# Patient Record
Sex: FEMALE | Race: WHITE | Employment: UNEMPLOYED | URBAN - METROPOLITAN AREA
[De-identification: names, ages, dates, MRNs, and addresses within clinical notes are randomized per-mention and may not be internally consistent; named-entity substitution may affect disease eponyms.]

---

## 2022-05-09 LAB
ABO, EXTERNAL RESULT: NORMAL
C. TRACHOMATIS, EXTERNAL RESULT: NEGATIVE
HEP B, EXTERNAL RESULT: NORMAL
HEPATITIS C ANTIBODY, EXTERNAL RESULT: NORMAL
HIV, EXTERNAL RESULT: NORMAL
N. GONORRHOEAE, EXTERNAL RESULT: NEGATIVE
RH FACTOR, EXTERNAL RESULT: POSITIVE
RHOGAM, EXTERNAL RESULT: NEGATIVE
RPR, EXTERNAL RESULT: NORMAL
RUBELLA TITER, EXTERNAL RESULT: NORMAL

## 2022-06-14 ENCOUNTER — INITIAL PRENATAL (OUTPATIENT)
Dept: OBGYN CLINIC | Age: 22
End: 2022-06-14

## 2022-06-14 VITALS — BODY MASS INDEX: 26.32 KG/M2 | WEIGHT: 139.4 LBS | HEIGHT: 61 IN

## 2022-06-14 DIAGNOSIS — F32.A ANXIETY AND DEPRESSION: ICD-10-CM

## 2022-06-14 DIAGNOSIS — F41.9 ANXIETY AND DEPRESSION: ICD-10-CM

## 2022-06-14 PROBLEM — Z34.00 SUPERVISION OF NORMAL FIRST TEEN PREGNANCY: Status: ACTIVE | Noted: 2022-06-14

## 2022-06-14 RX ORDER — ONDANSETRON 4 MG/1
4 TABLET, FILM COATED ORAL EVERY 8 HOURS PRN
COMMUNITY

## 2022-06-14 NOTE — PROGRESS NOTES
Kathleen Dixon G1, P0 presents to the office today for NOB talk and transfer in appointment. Patient is 11w6d and transferring in from [facility]. She had one visit there. She had US there confirming REMEDIOS of 12/28/22, and also US in ER that matches this. PNL labs, GC/Chl  completed as well. Records are in [facility]. No US performed today. FHT obtained by [id number] BPM.  Patient education was discussed including: nutrition, appropriate weight gain, diet, exercise, travel, hospital classes, breastfeeding/lactation services, flu vaccine, Tdap, glucola, GBS, and Corona Virus and Zika precautions. Genetic testing discussed in depth and patient elects NIPT, and this was drawn today. Patients past medical history is significant for childhood asthma, anxiety and depression. She is to return to the office in 1 weeks for NOB exam. All questions answered and she voiced full understanding. She is encouraged to call the office with any questions or concerns.

## 2022-06-23 ENCOUNTER — ROUTINE PRENATAL (OUTPATIENT)
Dept: OBGYN CLINIC | Age: 22
End: 2022-06-23
Payer: MEDICAID

## 2022-06-23 VITALS — WEIGHT: 139.6 LBS | DIASTOLIC BLOOD PRESSURE: 60 MMHG | BODY MASS INDEX: 26.38 KG/M2 | SYSTOLIC BLOOD PRESSURE: 110 MMHG

## 2022-06-23 DIAGNOSIS — O36.8390 UNABLE TO HEAR FETAL HEART TONES AS REASON FOR ULTRASOUND SCAN: Primary | ICD-10-CM

## 2022-06-23 DIAGNOSIS — Z13.89 SCREENING FOR GENITOURINARY CONDITION: ICD-10-CM

## 2022-06-23 DIAGNOSIS — Z34.02 ENCOUNTER FOR SUPERVISION OF NORMAL FIRST PREGNANCY IN SECOND TRIMESTER: ICD-10-CM

## 2022-06-23 DIAGNOSIS — Z3A.13 13 WEEKS GESTATION OF PREGNANCY: ICD-10-CM

## 2022-06-23 DIAGNOSIS — O35.8XX0 SUSPECTED DAMAGE TO FETUS FROM DISEASE IN MOTHER, ANTEPARTUM CONDITION, SINGLE OR UNSPECIFIED FETUS: ICD-10-CM

## 2022-06-23 LAB
GLUCOSE URINE, POC: NEGATIVE
PROTEIN,URINE, POC: NEGATIVE

## 2022-06-23 PROCEDURE — 99213 OFFICE O/P EST LOW 20 MIN: CPT | Performed by: OBSTETRICS & GYNECOLOGY

## 2022-06-23 PROCEDURE — 81002 URINALYSIS NONAUTO W/O SCOPE: CPT | Performed by: OBSTETRICS & GYNECOLOGY

## 2022-06-23 PROCEDURE — 76816 OB US FOLLOW-UP PER FETUS: CPT | Performed by: OBSTETRICS & GYNECOLOGY

## 2022-07-21 ENCOUNTER — ROUTINE PRENATAL (OUTPATIENT)
Dept: OBGYN CLINIC | Age: 22
End: 2022-07-21
Payer: MEDICAID

## 2022-07-21 VITALS — WEIGHT: 140.4 LBS | DIASTOLIC BLOOD PRESSURE: 80 MMHG | BODY MASS INDEX: 26.53 KG/M2 | SYSTOLIC BLOOD PRESSURE: 114 MMHG

## 2022-07-21 DIAGNOSIS — Z3A.17 17 WEEKS GESTATION OF PREGNANCY: ICD-10-CM

## 2022-07-21 DIAGNOSIS — Z34.02 ENCOUNTER FOR SUPERVISION OF NORMAL FIRST PREGNANCY IN SECOND TRIMESTER: Primary | ICD-10-CM

## 2022-07-21 DIAGNOSIS — Z13.89 SCREENING FOR GENITOURINARY CONDITION: ICD-10-CM

## 2022-07-21 LAB
GLUCOSE URINE, POC: NEGATIVE
PROTEIN,URINE, POC: NEGATIVE

## 2022-07-21 PROCEDURE — 99213 OFFICE O/P EST LOW 20 MIN: CPT | Performed by: OBSTETRICS & GYNECOLOGY

## 2022-07-21 PROCEDURE — 81002 URINALYSIS NONAUTO W/O SCOPE: CPT | Performed by: OBSTETRICS & GYNECOLOGY

## 2022-07-21 NOTE — PROGRESS NOTES
Ptl precautions. rtc 3 weeks for anatomy us. Having some congestion / fatigue / nausea. pcos - was told that she couldn't have babies. rtc 3 weeks for anatomy us.

## 2022-08-11 ENCOUNTER — ROUTINE PRENATAL (OUTPATIENT)
Dept: OBGYN CLINIC | Age: 22
End: 2022-08-11
Payer: MEDICAID

## 2022-08-11 VITALS — WEIGHT: 143.2 LBS | DIASTOLIC BLOOD PRESSURE: 68 MMHG | SYSTOLIC BLOOD PRESSURE: 110 MMHG | BODY MASS INDEX: 27.06 KG/M2

## 2022-08-11 DIAGNOSIS — Z34.02 ENCOUNTER FOR SUPERVISION OF NORMAL FIRST PREGNANCY IN SECOND TRIMESTER: ICD-10-CM

## 2022-08-11 DIAGNOSIS — Z3A.20 20 WEEKS GESTATION OF PREGNANCY: ICD-10-CM

## 2022-08-11 DIAGNOSIS — Z13.89 SCREENING FOR GENITOURINARY CONDITION: ICD-10-CM

## 2022-08-11 DIAGNOSIS — Z36.3 ENCOUNTER FOR ROUTINE SCREENING FOR FETAL MALFORMATION USING ULTRASOUND: Primary | ICD-10-CM

## 2022-08-11 LAB
GLUCOSE URINE, POC: NEGATIVE
PROTEIN,URINE, POC: NEGATIVE

## 2022-08-11 PROCEDURE — 99213 OFFICE O/P EST LOW 20 MIN: CPT | Performed by: OBSTETRICS & GYNECOLOGY

## 2022-08-11 PROCEDURE — 81002 URINALYSIS NONAUTO W/O SCOPE: CPT | Performed by: OBSTETRICS & GYNECOLOGY

## 2022-08-11 PROCEDURE — 76805 OB US >/= 14 WKS SNGL FETUS: CPT | Performed by: OBSTETRICS & GYNECOLOGY

## 2022-09-13 RX ORDER — FLUOXETINE 10 MG/1
CAPSULE ORAL
COMMUNITY
Start: 2022-08-12

## 2022-09-14 ENCOUNTER — ROUTINE PRENATAL (OUTPATIENT)
Dept: OBGYN CLINIC | Age: 22
End: 2022-09-14
Payer: MEDICAID

## 2022-09-14 VITALS — BODY MASS INDEX: 28.04 KG/M2 | DIASTOLIC BLOOD PRESSURE: 62 MMHG | SYSTOLIC BLOOD PRESSURE: 108 MMHG | WEIGHT: 148.4 LBS

## 2022-09-14 DIAGNOSIS — Z3A.25 25 WEEKS GESTATION OF PREGNANCY: ICD-10-CM

## 2022-09-14 DIAGNOSIS — Z13.89 SCREENING FOR GENITOURINARY CONDITION: ICD-10-CM

## 2022-09-14 DIAGNOSIS — Z36.2 ENCOUNTER FOR FOLLOW-UP ULTRASOUND OF FETAL ANATOMY: Primary | ICD-10-CM

## 2022-09-14 DIAGNOSIS — Z34.02 SUPERVISION OF NORMAL FIRST PREGNANCY IN SECOND TRIMESTER: ICD-10-CM

## 2022-09-14 LAB
GLUCOSE URINE, POC: NEGATIVE
PROTEIN,URINE, POC: NEGATIVE

## 2022-09-14 PROCEDURE — 81002 URINALYSIS NONAUTO W/O SCOPE: CPT | Performed by: OBSTETRICS & GYNECOLOGY

## 2022-09-14 PROCEDURE — 99213 OFFICE O/P EST LOW 20 MIN: CPT | Performed by: OBSTETRICS & GYNECOLOGY

## 2022-09-14 PROCEDURE — 76816 OB US FOLLOW-UP PER FETUS: CPT | Performed by: OBSTETRICS & GYNECOLOGY

## 2022-09-14 RX ORDER — IPRATROPIUM BROMIDE 21 UG/1
2 SPRAY, METERED NASAL EVERY 12 HOURS
COMMUNITY
Start: 2022-09-11 | End: 2022-10-27

## 2022-09-14 RX ORDER — PREDNISONE 5 MG/1
TABLET ORAL
COMMUNITY
Start: 2022-09-11 | End: 2022-09-14

## 2022-09-14 NOTE — PROGRESS NOTES
Patient Active Problem List    Diagnosis Date Noted    Supervision of normal first teen pregnancy 06/14/2022    Anxiety and depression 06/14/2022   Still suboptimal views of heart and profile- recheck in 3 weeks with glucola  All concerns addressed

## 2022-10-06 ENCOUNTER — ROUTINE PRENATAL (OUTPATIENT)
Dept: OBGYN CLINIC | Age: 22
End: 2022-10-06
Payer: COMMERCIAL

## 2022-10-06 VITALS — WEIGHT: 153 LBS | SYSTOLIC BLOOD PRESSURE: 118 MMHG | DIASTOLIC BLOOD PRESSURE: 70 MMHG | BODY MASS INDEX: 28.91 KG/M2

## 2022-10-06 DIAGNOSIS — Z36.2 ENCOUNTER FOR FOLLOW-UP ULTRASOUND OF FETAL ANATOMY: Primary | ICD-10-CM

## 2022-10-06 DIAGNOSIS — Z13.89 SCREENING FOR GENITOURINARY CONDITION: ICD-10-CM

## 2022-10-06 DIAGNOSIS — Z13.0 SCREENING, IRON DEFICIENCY ANEMIA: ICD-10-CM

## 2022-10-06 DIAGNOSIS — Z34.03 ENCOUNTER FOR SUPERVISION OF NORMAL FIRST PREGNANCY IN THIRD TRIMESTER: ICD-10-CM

## 2022-10-06 DIAGNOSIS — Z13.1 SCREENING FOR DIABETES MELLITUS: ICD-10-CM

## 2022-10-06 DIAGNOSIS — Z36.3 ENCOUNTER FOR ROUTINE SCREENING FOR FETAL MALFORMATION USING ULTRASOUND: ICD-10-CM

## 2022-10-06 DIAGNOSIS — Z3A.28 28 WEEKS GESTATION OF PREGNANCY: ICD-10-CM

## 2022-10-06 LAB
GLUCOSE 1 HOUR: 136 MG/DL
GLUCOSE URINE, POC: NEGATIVE
HGB BLD-MCNC: 11.1 G/DL (ref 11.7–15.4)
PROTEIN,URINE, POC: NEGATIVE

## 2022-10-06 PROCEDURE — 81002 URINALYSIS NONAUTO W/O SCOPE: CPT | Performed by: OBSTETRICS & GYNECOLOGY

## 2022-10-06 PROCEDURE — 76816 OB US FOLLOW-UP PER FETUS: CPT | Performed by: OBSTETRICS & GYNECOLOGY

## 2022-10-06 PROCEDURE — 99902 PR PRENATAL VISIT: CPT | Performed by: OBSTETRICS & GYNECOLOGY

## 2022-10-06 NOTE — PROGRESS NOTES
F/u anatomy  Glucola today  Patient Active Problem List    Diagnosis Date Noted    Supervision of normal first teen pregnancy 06/14/2022    Anxiety and depression 06/14/2022

## 2022-10-20 ENCOUNTER — NURSE ONLY (OUTPATIENT)
Dept: OBGYN CLINIC | Age: 22
End: 2022-10-20

## 2022-10-20 DIAGNOSIS — Z13.1 SCREENING FOR DIABETES MELLITUS: Primary | ICD-10-CM

## 2022-10-20 LAB
GESTATIONAL 3HR GTT: ABNORMAL
GLUCOSE 1H P 100 G GLC PO SERPL-MCNC: 129 MG/DL (ref 65–180)
GLUCOSE 2 HOUR: 122 MG/DL (ref 65–155)
GLUCOSE P FAST SERPL-MCNC: 96 MG/DL (ref 65–95)
GLUCOSE, 3 HOUR: 124 MG/DL (ref 65–140)

## 2022-10-27 ENCOUNTER — ROUTINE PRENATAL (OUTPATIENT)
Dept: OBGYN CLINIC | Age: 22
End: 2022-10-27
Payer: COMMERCIAL

## 2022-10-27 VITALS — WEIGHT: 156.4 LBS | SYSTOLIC BLOOD PRESSURE: 102 MMHG | DIASTOLIC BLOOD PRESSURE: 60 MMHG | BODY MASS INDEX: 29.55 KG/M2

## 2022-10-27 DIAGNOSIS — Z34.03 ENCOUNTER FOR SUPERVISION OF NORMAL FIRST PREGNANCY IN THIRD TRIMESTER: Primary | ICD-10-CM

## 2022-10-27 DIAGNOSIS — Z3A.31 31 WEEKS GESTATION OF PREGNANCY: ICD-10-CM

## 2022-10-27 DIAGNOSIS — Z13.89 SCREENING FOR GENITOURINARY CONDITION: ICD-10-CM

## 2022-10-27 DIAGNOSIS — Z23 FLU VACCINE NEED: ICD-10-CM

## 2022-10-27 DIAGNOSIS — Z23 NEED FOR DTAP VACCINE: ICD-10-CM

## 2022-10-27 LAB
GLUCOSE URINE, POC: NEGATIVE
PROTEIN,URINE, POC: NEGATIVE

## 2022-10-27 PROCEDURE — 90715 TDAP VACCINE 7 YRS/> IM: CPT | Performed by: OBSTETRICS & GYNECOLOGY

## 2022-10-27 PROCEDURE — 90472 IMMUNIZATION ADMIN EACH ADD: CPT | Performed by: OBSTETRICS & GYNECOLOGY

## 2022-10-27 PROCEDURE — 99902 PR PRENATAL VISIT: CPT | Performed by: OBSTETRICS & GYNECOLOGY

## 2022-10-27 PROCEDURE — 90674 CCIIV4 VAC NO PRSV 0.5 ML IM: CPT | Performed by: OBSTETRICS & GYNECOLOGY

## 2022-10-27 PROCEDURE — 90471 IMMUNIZATION ADMIN: CPT | Performed by: OBSTETRICS & GYNECOLOGY

## 2022-10-27 PROCEDURE — 81002 URINALYSIS NONAUTO W/O SCOPE: CPT | Performed by: OBSTETRICS & GYNECOLOGY

## 2022-10-27 RX ORDER — FAMOTIDINE 10 MG
10 TABLET ORAL 2 TIMES DAILY
COMMUNITY

## 2022-11-14 ENCOUNTER — ROUTINE PRENATAL (OUTPATIENT)
Dept: OBGYN CLINIC | Age: 22
End: 2022-11-14
Payer: COMMERCIAL

## 2022-11-14 VITALS — SYSTOLIC BLOOD PRESSURE: 118 MMHG | DIASTOLIC BLOOD PRESSURE: 72 MMHG | BODY MASS INDEX: 30.99 KG/M2 | WEIGHT: 164 LBS

## 2022-11-14 DIAGNOSIS — Z13.89 SCREENING FOR GENITOURINARY CONDITION: ICD-10-CM

## 2022-11-14 DIAGNOSIS — Z3A.33 33 WEEKS GESTATION OF PREGNANCY: ICD-10-CM

## 2022-11-14 DIAGNOSIS — Z34.03 ENCOUNTER FOR SUPERVISION OF NORMAL FIRST PREGNANCY IN THIRD TRIMESTER: Primary | ICD-10-CM

## 2022-11-14 LAB
GLUCOSE URINE, POC: NEGATIVE
PROTEIN,URINE, POC: NEGATIVE

## 2022-11-14 PROCEDURE — 81002 URINALYSIS NONAUTO W/O SCOPE: CPT | Performed by: NURSE PRACTITIONER

## 2022-11-14 PROCEDURE — 99902 PR PRENATAL VISIT: CPT | Performed by: NURSE PRACTITIONER

## 2022-11-14 NOTE — PROGRESS NOTES
Doing well. No complaints. PTL precautions and [address] reviewed.  RTC 2 weeks for GBS

## 2022-11-22 ENCOUNTER — TELEPHONE (OUTPATIENT)
Dept: OBGYN CLINIC | Age: 22
End: 2022-11-22

## 2022-11-22 ENCOUNTER — HOSPITAL ENCOUNTER (INPATIENT)
Age: 22
LOS: 3 days | Discharge: HOME | End: 2022-11-25
Attending: OBSTETRICS & GYNECOLOGY | Admitting: OBSTETRICS & GYNECOLOGY
Payer: COMMERCIAL

## 2022-11-22 PROBLEM — O60.00 PRETERM LABOR WITHOUT DELIVERY: Status: ACTIVE | Noted: 2022-11-22

## 2022-11-22 LAB
ABO + RH BLD: NORMAL
AMNISURE, POC: NEGATIVE
BACTERIA SPEC CULT: NORMAL
BASOPHILS # BLD: 0.1 K/UL (ref 0–0.2)
BASOPHILS NFR BLD: 0 % (ref 0–2)
BLOOD GROUP ANTIBODIES SERPL: NORMAL
DIFFERENTIAL METHOD BLD: ABNORMAL
EOSINOPHIL # BLD: 0 K/UL (ref 0–0.8)
EOSINOPHIL NFR BLD: 0 % (ref 0.5–7.8)
ERYTHROCYTE [DISTWIDTH] IN BLOOD BY AUTOMATED COUNT: 13 % (ref 11.9–14.6)
HCT VFR BLD AUTO: 36.4 % (ref 35.8–46.3)
HGB BLD-MCNC: 11.9 G/DL (ref 11.7–15.4)
IMM GRANULOCYTES # BLD AUTO: 0.1 K/UL (ref 0–0.5)
IMM GRANULOCYTES NFR BLD AUTO: 1 % (ref 0–5)
LYMPHOCYTES # BLD: 2.1 K/UL (ref 0.5–4.6)
LYMPHOCYTES NFR BLD: 13 % (ref 13–44)
Lab: NORMAL
MCH RBC QN AUTO: 30.7 PG (ref 26.1–32.9)
MCHC RBC AUTO-ENTMCNC: 32.7 G/DL (ref 31.4–35)
MCV RBC AUTO: 94.1 FL (ref 82–102)
MONOCYTES # BLD: 1.3 K/UL (ref 0.1–1.3)
MONOCYTES NFR BLD: 8 % (ref 4–12)
NEGATIVE QC PASS/FAIL: NORMAL
NEUTS SEG # BLD: 12.4 K/UL (ref 1.7–8.2)
NEUTS SEG NFR BLD: 78 % (ref 43–78)
NRBC # BLD: 0 K/UL (ref 0–0.2)
PLATELET # BLD AUTO: 349 K/UL (ref 150–450)
PMV BLD AUTO: 10 FL (ref 9.4–12.3)
POSITIVE QC PASS/FAIL: NORMAL
RBC # BLD AUTO: 3.87 M/UL (ref 4.05–5.2)
SERVICE CMNT-IMP: NORMAL
SPECIMEN EXP DATE BLD: NORMAL
WBC # BLD AUTO: 16 K/UL (ref 4.3–11.1)

## 2022-11-22 PROCEDURE — 85025 COMPLETE CBC W/AUTO DIFF WBC: CPT

## 2022-11-22 PROCEDURE — 6360000002 HC RX W HCPCS: Performed by: OBSTETRICS & GYNECOLOGY

## 2022-11-22 PROCEDURE — 6370000000 HC RX 637 (ALT 250 FOR IP): Performed by: OBSTETRICS & GYNECOLOGY

## 2022-11-22 PROCEDURE — 87081 CULTURE SCREEN ONLY: CPT

## 2022-11-22 PROCEDURE — 87653 STREP B DNA AMP PROBE: CPT

## 2022-11-22 PROCEDURE — 59025 FETAL NON-STRESS TEST: CPT

## 2022-11-22 PROCEDURE — 1100000000 HC RM PRIVATE

## 2022-11-22 PROCEDURE — 99285 EMERGENCY DEPT VISIT HI MDM: CPT

## 2022-11-22 PROCEDURE — 36415 COLL VENOUS BLD VENIPUNCTURE: CPT

## 2022-11-22 PROCEDURE — 2580000003 HC RX 258: Performed by: OBSTETRICS & GYNECOLOGY

## 2022-11-22 PROCEDURE — 86901 BLOOD TYPING SEROLOGIC RH(D): CPT

## 2022-11-22 RX ORDER — TERBUTALINE SULFATE 1 MG/ML
0.25 INJECTION, SOLUTION SUBCUTANEOUS ONCE
Status: COMPLETED | OUTPATIENT
Start: 2022-11-22 | End: 2022-11-22

## 2022-11-22 RX ORDER — ONDANSETRON 8 MG/1
4 TABLET, ORALLY DISINTEGRATING ORAL EVERY 8 HOURS PRN
Status: DISCONTINUED | OUTPATIENT
Start: 2022-11-22 | End: 2022-11-25 | Stop reason: HOSPADM

## 2022-11-22 RX ORDER — SODIUM CHLORIDE 0.9 % (FLUSH) 0.9 %
5-40 SYRINGE (ML) INJECTION EVERY 12 HOURS SCHEDULED
Status: DISCONTINUED | OUTPATIENT
Start: 2022-11-22 | End: 2022-11-25 | Stop reason: ALTCHOICE

## 2022-11-22 RX ORDER — SODIUM CHLORIDE, SODIUM LACTATE, POTASSIUM CHLORIDE, CALCIUM CHLORIDE 600; 310; 30; 20 MG/100ML; MG/100ML; MG/100ML; MG/100ML
INJECTION, SOLUTION INTRAVENOUS CONTINUOUS
Status: DISCONTINUED | OUTPATIENT
Start: 2022-11-22 | End: 2022-11-24 | Stop reason: ALTCHOICE

## 2022-11-22 RX ORDER — SODIUM CHLORIDE 0.9 % (FLUSH) 0.9 %
5-40 SYRINGE (ML) INJECTION PRN
Status: DISCONTINUED | OUTPATIENT
Start: 2022-11-22 | End: 2022-11-25 | Stop reason: ALTCHOICE

## 2022-11-22 RX ORDER — FAMOTIDINE 20 MG/1
10 TABLET, FILM COATED ORAL 2 TIMES DAILY
Status: DISCONTINUED | OUTPATIENT
Start: 2022-11-22 | End: 2022-11-24 | Stop reason: ALTCHOICE

## 2022-11-22 RX ORDER — ACETAMINOPHEN 650 MG/1
650 SUPPOSITORY RECTAL EVERY 4 HOURS PRN
Status: DISCONTINUED | OUTPATIENT
Start: 2022-11-22 | End: 2022-11-24 | Stop reason: ALTCHOICE

## 2022-11-22 RX ORDER — NIFEDIPINE 10 MG/1
20 CAPSULE ORAL EVERY 6 HOURS SCHEDULED
Status: DISCONTINUED | OUTPATIENT
Start: 2022-11-22 | End: 2022-11-24 | Stop reason: ALTCHOICE

## 2022-11-22 RX ORDER — SODIUM CHLORIDE 9 MG/ML
INJECTION, SOLUTION INTRAVENOUS PRN
Status: DISCONTINUED | OUTPATIENT
Start: 2022-11-22 | End: 2022-11-24 | Stop reason: ALTCHOICE

## 2022-11-22 RX ORDER — FLUOXETINE 10 MG/1
10 CAPSULE ORAL DAILY
Status: DISCONTINUED | OUTPATIENT
Start: 2022-11-23 | End: 2022-11-25 | Stop reason: HOSPADM

## 2022-11-22 RX ORDER — BETAMETHASONE SODIUM PHOSPHATE AND BETAMETHASONE ACETATE 3; 3 MG/ML; MG/ML
12 INJECTION, SUSPENSION INTRA-ARTICULAR; INTRALESIONAL; INTRAMUSCULAR; SOFT TISSUE EVERY 24 HOURS
Status: COMPLETED | OUTPATIENT
Start: 2022-11-22 | End: 2022-11-23

## 2022-11-22 RX ORDER — ACETAMINOPHEN 325 MG/1
650 TABLET ORAL EVERY 4 HOURS PRN
Status: DISCONTINUED | OUTPATIENT
Start: 2022-11-22 | End: 2022-11-24 | Stop reason: ALTCHOICE

## 2022-11-22 RX ORDER — ONDANSETRON 2 MG/ML
4 INJECTION INTRAMUSCULAR; INTRAVENOUS EVERY 6 HOURS PRN
Status: DISCONTINUED | OUTPATIENT
Start: 2022-11-22 | End: 2022-11-25 | Stop reason: ALTCHOICE

## 2022-11-22 RX ADMIN — BUTORPHANOL TARTRATE 1 MG: 2 INJECTION, SOLUTION INTRAMUSCULAR; INTRAVENOUS at 22:43

## 2022-11-22 RX ADMIN — TERBUTALINE SULFATE 0.25 MG: 1 INJECTION, SOLUTION SUBCUTANEOUS at 16:31

## 2022-11-22 RX ADMIN — TERBUTALINE SULFATE 0.25 MG: 1 INJECTION, SOLUTION SUBCUTANEOUS at 19:38

## 2022-11-22 RX ADMIN — BETAMETHASONE SODIUM PHOSPHATE AND BETAMETHASONE ACETATE 12 MG: 3; 3 INJECTION, SUSPENSION INTRA-ARTICULAR; INTRALESIONAL; INTRAMUSCULAR at 16:32

## 2022-11-22 RX ADMIN — ACETAMINOPHEN 650 MG: 325 TABLET, FILM COATED ORAL at 19:37

## 2022-11-22 RX ADMIN — SODIUM CHLORIDE, POTASSIUM CHLORIDE, SODIUM LACTATE AND CALCIUM CHLORIDE: 600; 310; 30; 20 INJECTION, SOLUTION INTRAVENOUS at 22:42

## 2022-11-22 RX ADMIN — FAMOTIDINE 10 MG: 20 TABLET, FILM COATED ORAL at 21:24

## 2022-11-22 RX ADMIN — NIFEDIPINE 20 MG: 10 CAPSULE ORAL at 17:20

## 2022-11-22 RX ADMIN — SODIUM CHLORIDE, POTASSIUM CHLORIDE, SODIUM LACTATE AND CALCIUM CHLORIDE: 600; 310; 30; 20 INJECTION, SOLUTION INTRAVENOUS at 16:49

## 2022-11-22 ASSESSMENT — PAIN SCALES - GENERAL
PAINLEVEL_OUTOF10: 7
PAINLEVEL_OUTOF10: 7

## 2022-11-22 ASSESSMENT — PAIN DESCRIPTION - LOCATION: LOCATION: BACK

## 2022-11-22 NOTE — H&P
Obstetrics History & Physical    Name: Rebecca Abdi  Date: 2022 4:12 PM  MRN#: [id number]  : 2000  Age/Sex: 23 y. o.female  Admission Date: 2022  Admitting Provider: Debra Espino MD    HPI: Rebecca Abdi is a 23 y.o.  female with Estimated Date of Delivery: 22 at 34w6d gestation who presents for contractions/possible labor. Her current obstetrical history is significant for uncomplicated pregnancy. Prenatal records reviewed. Irregular back and lower abdominal pains since last night. Worsening today. She is unsure if they are contractions or not. She reports good fetal movement. She denies vaginal bleeding. She denies leakage of fluid. She has had an increased amount of thick yellow discharge recently. Past History:  Obstetrics History:  OB History    Para Term  AB Living   1             SAB IAB Ectopic Molar Multiple Live Births                    # Outcome Date GA Lbr Darion/2nd Weight Sex Delivery Anes PTL Lv   1 Current                Medical History:  Past Medical History:   Diagnosis Date    Anxiety and depression     Asthma     childhood    Kawasaki disease ([facility] [address]     at age 13     Past Surgical History:   Procedure Laterality Date    FINGER SURGERY      LABIAPLASTY      MYRINGOTOMY      WISDOM TOOTH EXTRACTION       Social History     Tobacco Use    Smoking status: Never    Smokeless tobacco: Never   Substance Use Topics    Alcohol use: Not Currently     Family History   Problem Relation Age of Onset    Hypertension Mother      Prior to Admission medications    Medication Sig Start Date End Date Taking?  Authorizing Provider   famotidine (PEPCID) 10 MG tablet Take 10 mg by mouth 2 times daily    Historical Provider, MD   FLUoxetine (PROZAC) 10 MG capsule  22   Historical Provider, MD   Prenatal MV & Min w/FA-DHA (PRENATAL GUMMIES) 0.18-25 MG CHEW Take 1 Dose by mouth daily 22   Ulysses Rave, MD   ondansetron (ZOFRAN) 4 MG tablet Take 4 mg by course, 1 dose brethine    Discussed with: Dr. Piotr Nieto

## 2022-11-22 NOTE — TELEPHONE ENCOUNTER
Pt significant other, Tico Villarreal called stating that he wanted to speak with someone regarding pressure is pts abdomen and lower back that is intermittent. Pt has been messaging through [employer] with [place] office and was advised to go to [facility]. Called back, Tico Villarreal is on [facility]. He then handed the phone to pt who stated that she is on her way to [facility] at this time. She needed to know what entrance to go into. Advised pt to go into entrance C and up to L&D. She voiced full understanding and is aware. All questions answered.

## 2022-11-23 PROCEDURE — 6360000002 HC RX W HCPCS: Performed by: OBSTETRICS & GYNECOLOGY

## 2022-11-23 PROCEDURE — 99255 IP/OBS CONSLTJ NEW/EST HI 80: CPT | Performed by: OBSTETRICS & GYNECOLOGY

## 2022-11-23 PROCEDURE — 99232 SBSQ HOSP IP/OBS MODERATE 35: CPT | Performed by: OBSTETRICS & GYNECOLOGY

## 2022-11-23 PROCEDURE — 6370000000 HC RX 637 (ALT 250 FOR IP): Performed by: OBSTETRICS & GYNECOLOGY

## 2022-11-23 PROCEDURE — 1100000000 HC RM PRIVATE

## 2022-11-23 RX ORDER — CYCLOBENZAPRINE HCL 10 MG
10 TABLET ORAL 3 TIMES DAILY PRN
Status: DISCONTINUED | OUTPATIENT
Start: 2022-11-23 | End: 2022-11-25 | Stop reason: HOSPADM

## 2022-11-23 RX ADMIN — BUTORPHANOL TARTRATE 1 MG: 2 INJECTION, SOLUTION INTRAMUSCULAR; INTRAVENOUS at 17:23

## 2022-11-23 RX ADMIN — CYCLOBENZAPRINE 10 MG: 10 TABLET, FILM COATED ORAL at 22:08

## 2022-11-23 RX ADMIN — BUTORPHANOL TARTRATE 1 MG: 2 INJECTION, SOLUTION INTRAMUSCULAR; INTRAVENOUS at 05:09

## 2022-11-23 RX ADMIN — BETAMETHASONE SODIUM PHOSPHATE AND BETAMETHASONE ACETATE 12 MG: 3; 3 INJECTION, SUSPENSION INTRA-ARTICULAR; INTRALESIONAL; INTRAMUSCULAR at 16:33

## 2022-11-23 RX ADMIN — NIFEDIPINE 20 MG: 10 CAPSULE ORAL at 06:08

## 2022-11-23 RX ADMIN — BUTORPHANOL TARTRATE 1 MG: 2 INJECTION, SOLUTION INTRAMUSCULAR; INTRAVENOUS at 01:44

## 2022-11-23 RX ADMIN — NIFEDIPINE 20 MG: 10 CAPSULE ORAL at 00:05

## 2022-11-23 RX ADMIN — FLUOXETINE HYDROCHLORIDE 10 MG: 10 CAPSULE ORAL at 09:21

## 2022-11-23 RX ADMIN — ACETAMINOPHEN 650 MG: 325 TABLET, FILM COATED ORAL at 21:52

## 2022-11-23 RX ADMIN — ONDANSETRON 4 MG: 8 TABLET, ORALLY DISINTEGRATING ORAL at 22:11

## 2022-11-23 RX ADMIN — FAMOTIDINE 10 MG: 20 TABLET, FILM COATED ORAL at 09:21

## 2022-11-23 RX ADMIN — ACETAMINOPHEN 650 MG: 325 TABLET, FILM COATED ORAL at 04:05

## 2022-11-23 RX ADMIN — NIFEDIPINE 10 MG: 10 CAPSULE ORAL at 12:00

## 2022-11-23 RX ADMIN — BUTORPHANOL TARTRATE 1 MG: 2 INJECTION, SOLUTION INTRAMUSCULAR; INTRAVENOUS at 21:55

## 2022-11-23 RX ADMIN — BUTORPHANOL TARTRATE 1 MG: 2 INJECTION, SOLUTION INTRAMUSCULAR; INTRAVENOUS at 12:00

## 2022-11-23 RX ADMIN — FAMOTIDINE 10 MG: 20 TABLET, FILM COATED ORAL at 21:55

## 2022-11-23 RX ADMIN — CYCLOBENZAPRINE 10 MG: 10 TABLET, FILM COATED ORAL at 11:59

## 2022-11-23 RX ADMIN — BUTORPHANOL TARTRATE 1 MG: 2 INJECTION, SOLUTION INTRAMUSCULAR; INTRAVENOUS at 08:11

## 2022-11-23 RX ADMIN — ONDANSETRON 4 MG: 2 INJECTION INTRAMUSCULAR; INTRAVENOUS at 10:08

## 2022-11-23 ASSESSMENT — PAIN DESCRIPTION - DESCRIPTORS
DESCRIPTORS: CRAMPING

## 2022-11-23 ASSESSMENT — PAIN SCALES - GENERAL
PAINLEVEL_OUTOF10: 8
PAINLEVEL_OUTOF10: 7
PAINLEVEL_OUTOF10: 7
PAINLEVEL_OUTOF10: 5
PAINLEVEL_OUTOF10: 7
PAINLEVEL_OUTOF10: 7
PAINLEVEL_OUTOF10: 9

## 2022-11-23 ASSESSMENT — PAIN DESCRIPTION - LOCATION
LOCATION: ABDOMEN
LOCATION: ABDOMEN;BACK
LOCATION: ABDOMEN;BACK
LOCATION: ABDOMEN
LOCATION: BACK;ABDOMEN
LOCATION: HEAD

## 2022-11-23 ASSESSMENT — PAIN DESCRIPTION - ORIENTATION: ORIENTATION: ANTERIOR

## 2022-11-23 NOTE — PROGRESS NOTES
Pt to room CAN for triage with chief complaint of low back pain. Assessment begins, EFM and [place] applied to a soft non tender abdomen and tracing well. Dr Khurram Cheney called to assess patient.

## 2022-11-23 NOTE — PROGRESS NOTES
High Risk Obstetrics Progress Note    Name: Jed Wade MRN: [id number]  SSN: [id number]    YOB: 2000  Age: 23 y.o. Sex: female      Subjective:      LOS: 1 day    Estimated Date of Delivery: 22   Gestational Age Today: 35w0d     Patient admitted for  labor. States she does have normal fetal movement and back and LLQ pain  and does not have pelvic pressure, vaginal bleeding , and vaginal leaking of fluid . Objective:     Vitals:  Blood pressure (!) 120/57, pulse (!) 121, temperature 98.2 °F (36.8 °C), temperature source Oral, resp. rate 18, last menstrual period 2022, SpO2 99 %.    Temp (24hrs), Av.3 °F (36.8 °C), Min:98 °F (36.7 °C), Max:98.5 °F (18.2 °C)    Systolic (24CHO), NA , Min:115 , DFE:510      Diastolic (04QNF), WAY:26, Min:57, Max:84       Intake and Output:         Physical Exam:  Abdomen soft non tender FF non tender     Very tender to palpation of bilateral SI joints with L greater that R and this is where her pain is most    Membranes:  Intact    Cervix 3/90/-3    Uterine Activity:  irregular Uc's on monitor    Fetal Heart Rate:  Reactive        Labs:   Recent Results (from the past 36 hour(s))   CBC with Auto Differential    Collection Time: 22  4:34 PM   Result Value Ref Range    WBC 16.0 (H) 4.3 - 11.1 K/uL    RBC 3.87 (L) 4.05 - 5.2 M/uL    Hemoglobin 11.9 11.7 - 15.4 g/dL    Hematocrit 36.4 35.8 - 46.3 %    MCV 94.1 82.0 - 102.0 FL    MCH 30.7 26.1 - 32.9 PG    MCHC 32.7 31.4 - 35.0 g/dL    RDW 13.0 11.9 - 14.6 %    Platelets 215 719 - 307 K/uL    MPV 10.0 9.4 - 12.3 FL    nRBC 0.00 0.0 - 0.2 K/uL    Differential Type AUTOMATED      Seg Neutrophils 78 43 - 78 %    Lymphocytes 13 13 - 44 %    Monocytes 8 4.0 - 12.0 %    Eosinophils % 0 (L) 0.5 - 7.8 %    Basophils 0 0.0 - 2.0 %    Immature Granulocytes 1 0.0 - 5.0 %    Segs Absolute 12.4 (H) 1.7 - 8.2 K/UL    Absolute Lymph # 2.1 0.5 - 4.6 K/UL    Absolute Mono # 1.3 0.1 - 1.3 K/UL Absolute Eos # 0.0 0.0 - 0.8 K/UL    Basophils Absolute 0.1 0.0 - 0.2 K/UL    Absolute Immature Granulocyte 0.1 0.0 - 0.5 K/UL   TYPE AND SCREEN    Collection Time: 22  4:34 PM   Result Value Ref Range    Crossmatch expiration date 2022,2359     ABO/Rh A POSITIVE     Antibody Screen NEG    Culture, Strep B Screen, Vaginal/Rectal    Collection Time: 22  4:34 PM    Specimen: Vaginal; Genital   Result Value Ref Range    Special Requests NO SPECIAL REQUESTS      Culture        No growth after short period of incubation. Further results to follow after overnight incubation. Group B Strep,PCR    Collection Time: 22  4:34 PM    Specimen: Vaginal/Rectal   Result Value Ref Range    Special Requests NO SPECIAL REQUESTS      Culture        NEGATIVE: GBS target nucleic acid is not detected. Presumed not colonized for GBS. POC AmniSure    Collection Time: 22  5:39 PM   Result Value Ref Range    Amnisure, POC Negative Negative    Lot Number [id number]     Positive QC Pass/Fail Acceptable     Negative QC Pass/Fail Acceptable        Assessment and Plan:      Principal Problem:     labor without delivery  Resolved Problems:    * No resolved hospital problems. *      Labor:  48 hour course of steroids to promote fetal lung maturity. Continuous Tocodynamometer   Cervix has not changed so not in labor   Discussed SI joint and Musculoskeletal pain  Will continue the procardia to help decrease contractions.   Add Flexeril   Encouraged try to rest  Will get second steroid later this afternoon

## 2022-11-23 NOTE — PROGRESS NOTES
Shailesh from [facility], [address] [place] [address]. Pt assumed for care. Into room 436 to discuss plan with pt. Explained concern about the effectiveness of an early epidural with a possible induction to follow 24 hrs later. Pt would like to wait on her epidural at this time and manage pain with meds.

## 2022-11-23 NOTE — PROGRESS NOTES
Pt transferred to 436 to get epidural for pain management per Dr Dank Valdivia. Pt talking and texting on her phone without any c/o voiced at present.

## 2022-11-23 NOTE — PROGRESS NOTES
Dr Rose Lo in to see pt. SVE done- no change in cervix. Plan of care discussed with pt and family to adjust medications to see if can get better pain control. Pt agrees with plan at this time.

## 2022-11-23 NOTE — CONSULTS
Maternal Fetal Medicine Inpatient Consult Note    Consulting:     Chief Complaint:  Pregnancy and Lower abdominal pain. History of Present Illness: 23 y.o.  at 29w0d gestation who presents with LAP and Cervix dilated to 3 cm/completely efface. Unchanged Cervical exam since admission. Patient complains of severe LL quadrant pain that ratiages to left lower back. Pain improves and she can rest for about an hour then returns. Pain started 3 days ago, is severe, spreading from the lower back and is getting worse and she cannot rest.    Patient has had vomiting earlier, no appetite. No regular contractions, LOF, VB. Good FM. Minimal edema. No chest pain or shortness of breath. No significant reflux, constipation, or other GI complaints. Review of Systems: per HPI, otherwise unremarkable.      History:  OB History    Para Term  AB Living   1             SAB IAB Ectopic Molar Multiple Live Births                    # Outcome Date GA Lbr Darion/2nd Weight Sex Delivery Anes PTL Lv   1 Current                Past Surgical History:   Procedure Laterality Date    FINGER SURGERY      LABIAPLASTY      MYRINGOTOMY      WISDOM TOOTH EXTRACTION         Past Medical History:   Diagnosis Date    Anxiety and depression     Asthma     childhood    Kawasaki disease ([facility] [address]     at age 13       Family History   Problem Relation Age of Onset    Hypertension Mother        Social History     Socioeconomic History    Marital status:      Spouse name: Not on file    Number of children: Not on file    Years of education: Not on file    Highest education level: Not on file   Occupational History    Not on file   Tobacco Use    Smoking status: Never    Smokeless tobacco: Never   Vaping Use    Vaping Use: Never used   Substance and Sexual Activity    Alcohol use: Not Currently    Drug use: Not Currently    Sexual activity: Yes     Partners: Male   Other Topics Concern    Not on file   Social History Narrative    Not on file     Social Determinants of Health     Financial Resource Strain: Not on file   Food Insecurity: Not on file   Transportation Needs: Not on file   Physical Activity: Not on file   Stress: Not on file   Social Connections: Not on file   Intimate Partner Violence: Not on file   Housing Stability: Not on file       Allergies   Allergen Reactions    Amoxicillin-Pot Clavulanate Hives     Other reaction(s): Hives/Swelling-Allergy         Current Facility-Administered Medications:     cyclobenzaprine (FLEXERIL) tablet 10 mg, 10 mg, Oral, TID PRN, Sharlene Crocker MD, 10 mg at 11/23/22 1159    lactated ringers infusion, , IntraVENous, Continuous, Elyse Lloyd MD, Last Rate: 125 mL/hr at 11/22/22 2242, New Bag at 11/22/22 2242    sodium chloride flush 0.9 % injection 5-40 mL, 5-40 mL, IntraVENous, 2 times per day, Elyse Lloyd MD    sodium chloride flush 0.9 % injection 5-40 mL, 5-40 mL, IntraVENous, PRN, Elyse Lloyd MD    0.9 % sodium chloride infusion, , IntraVENous, PRN, Elyse Lloyd MD    ondansetron (ZOFRAN-ODT) disintegrating tablet 4 mg, 4 mg, Oral, Q8H PRN **OR** ondansetron (ZOFRAN) injection 4 mg, 4 mg, IntraVENous, Q6H PRN, Elyse Lloyd MD, 4 mg at 11/23/22 1008    acetaminophen (TYLENOL) tablet 650 mg, 650 mg, Oral, Q4H PRN, 650 mg at 11/23/22 0405 **OR** acetaminophen (TYLENOL) suppository 650 mg, 650 mg, Rectal, Q4H PRN, Elyse Lloyd MD    betamethasone acetate-betamethasone sodium phosphate (CELESTONE) injection 12 mg, 12 mg, IntraMUSCular, Q24H, Elyse Lloyd MD, 12 mg at 11/22/22 1632    famotidine (PEPCID) tablet 10 mg, 10 mg, Oral, BID, Elyse Lloyd MD, 10 mg at 11/23/22 0921    FLUoxetine (PROZAC) capsule 10 mg, 10 mg, Oral, Daily, Elyse Lloyd MD, 10 mg at 11/23/22 0921    NIFEdipine (PROCARDIA) capsule 20 mg, 20 mg, Oral, 4 times per day, Ira Ellis DO, 10 mg at 11/23/22 1200    butorphanol (STADOL) injection 1 mg, 1 mg, IntraVENous, Q3H PRN, Ira Ellis DO, 1 mg at 11/23/22 1200 promethazine (PHENERGAN) 12.5 mg in sodium chloride 0.9 % 50 mL IVPB, 12.5 mg, IntraVENous, Q6H PRN, Ira Ellis,     Vitals:     Patient Vitals for the past 24 hrs:   Temp Pulse Resp BP SpO2   11/23/22 1200 -- -- -- 118/64 --   11/23/22 0810 98.2 °F (36.8 °C) (!) 121 18 (!) 120/57 --   11/23/22 0334 98 °F (36.7 °C) 96 -- 122/61 --   11/23/22 0046 -- (!) 134 -- (!) 115/59 --   11/22/22 2118 98.4 °F (36.9 °C) (!) 133 18 123/68 --   11/22/22 1921 98.4 °F (36.9 °C) (!) 118 18 124/72 99 %   11/22/22 1719 -- (!) 114 -- 138/66 --   11/22/22 1656 -- (!) 116 -- 136/80 --   11/22/22 1534 98.5 °F (36.9 °C) 89 18 132/84 97 %        I&O:   No intake/output data recorded. No intake/output data recorded. Output by Drain (mL) 11/21/22 0701 - 11/21/22 1900 11/21/22 1901 - 11/22/22 0700 11/22/22 0701 - 11/22/22 1900 11/22/22 1901 - 11/23/22 0700 11/23/22 0701 - 11/23/22 1435   Patient has no LDAs of requested type attached. Exam:   Constitutional: Patient with distress. Body with shakes, in significant pain. HEENT: Symmetric without facial palsy, uncorrected poor hearing or uncorrected poor vision. No thyroid enlargement or goiter  Chest: No use of accessory muscles or excessive work of breathing    Abdomen: gravid, soft, non-tender without masses. Fundus: soft and non tender  Left sided SI tenderness to deep palpation. No CVAT. Genitourinary: deferred as without complaints of labor or ROM; Cervical Exam:     Vital Signs  Temp: 98.2 °F (36.8 °C)  Temp Source: Oral  Heart Rate: (!) 121  Resp: 18  BP: 118/64  SpO2: 99 %   Effacement: 100  Lower Extremities:  Edema: 1+ bilaterally  Patellar Reflexes: 1+ bilaterally; Clonus: absent  Skin: normal coloration and turgor, no rashes, no suspicious skin lesions noted. Neurologic: AOx3. Gait normal. Reflexes and motor strength normal and symmetric. Cranial nerves 2-12 and sensation grossly intact.   Psychiatric: anxious    Maternal and Fetal Monitoring: Uterine Activity:  Contraction Duration: 50-60Contraction Intensity: Mild  Fetal Heart Rate:             Labs:   CBC:    Lab Results   Component Value Date    WBC 16.0 (H) 2022    HGB 11.9 2022    HCT 36.4 2022    MCV 94.1 2022     2022        Assessment and Plan:  23 y.o.  at 35w0d with   Patient Active Problem List    Diagnosis Date Noted     labor without delivery 2022     Priority: High     2022 Inpatient [facility] Consult- Patient presented with contractions and advanced dilatation of 3/C. Continues to be in severe, rigoroous  Pain. Could be secondary to low fetal head and pressure on cervix. Patient cannot be sent home with this debilitating pain. I explained risks of  pulmonary prematurity with delivery soon. They all understand, but want her delivered as soon as possible. I explained that I would like to get 48 hours of steroids before intervene to promote delivery. I suggested giving her an epidural for pain control, then proceed with induction 48 hours from first dose of steroids. The patient, FOB and patients parents agree with this plan. GBS Negative. Place Epidural at 4:00 or try more frequent and higher doses of Stadol. Plan augmenting labor tomorrow after 48 hours of steroids if Epidural in place. Anxiety and depression 2022     Priority: Medium     Patient stopped taking Prozac with + UPT. Denies any current sx. Supervision of normal first teen pregnancy 2022     Priority: Low     Transfer in from [facility](only 1 visit). NIPT-negative x 3  COVID-vaccine x 2  Tdap-  Flu-    GTT-136 (10/6/22), needs 3 hour GTT  3 hour GTT - passed                 Eric Muñiz MD   2022     Time:110    Minutes spent on floor,with greater than 50% of the time examining patient, explaining plan and coordinating care with nurse and requesting primary physician.

## 2022-11-23 NOTE — PROGRESS NOTES
RN notified MD Ellis of updated SVE 3/100/-2. Received telephone order for stadol and phenergan PRN for pain. See new orders for more information. Plan to leave in room 465 unless she makes change >4-5cm.

## 2022-11-23 NOTE — CONSULTS
Neonatology Antepartum Consultation    Name: Rick Lacy      Medical Record Number: [id number]      YOB: 2000     Today's Date: 2022                                                                 Date of Consultation:  2022  Time: 9:24 PM  Attending MD: Greg Morin MD  Consulting Physician: Sultana Stratton MD  Reason for Consultation:   labor    Subjective:     Age: 23 y.o.  Arbie Congo:   Gestation age: 34w7d      Maternal steroids:  Yes 1st dose 22     Objective:     Medications:   Current Facility-Administered Medications   Medication Dose Route Frequency    lactated ringers infusion   IntraVENous Continuous    sodium chloride flush 0.9 % injection 5-40 mL  5-40 mL IntraVENous 2 times per day    sodium chloride flush 0.9 % injection 5-40 mL  5-40 mL IntraVENous PRN    0.9 % sodium chloride infusion   IntraVENous PRN    ondansetron (ZOFRAN-ODT) disintegrating tablet 4 mg  4 mg Oral Q8H PRN    Or    ondansetron (ZOFRAN) injection 4 mg  4 mg IntraVENous Q6H PRN    acetaminophen (TYLENOL) tablet 650 mg  650 mg Oral Q4H PRN    Or    acetaminophen (TYLENOL) suppository 650 mg  650 mg Rectal Q4H PRN    betamethasone acetate-betamethasone sodium phosphate (CELESTONE) injection 12 mg  12 mg IntraMUSCular Q24H    famotidine (PEPCID) tablet 10 mg  10 mg Oral BID    [START ON 2022] FLUoxetine (PROZAC) capsule 10 mg  10 mg Oral Daily    NIFEdipine (PROCARDIA) capsule 20 mg  20 mg Oral 4 times per day     Data Review:  Lab:   Lab Results   Component Value Date/Time    ABORH A POSITIVE 2022 04:34 PM       Assessment:      Principal Problem:     labor without delivery  Resolved Problems:    * No resolved hospital problems.  *      OB Concerns/Plan:     Common problems at this Gestation Age: 29 + 6 include respiratory distress ( requiring CPAP/HFNC, intubation/surfactant - not discussed at this time as mother appeared anxious and I mentioned if patient needed SCN admission then would discuss further at that time), hypoglycemia (requiring IV fluids), poor feeding (requiring IV fluids, NG feedings), hypothermia (requiring radiant warmer/isolette) and sepsis (requiring blood culture/cbc and antibiotics). I spoke to them in detail regarding what to expect from the delivery process and SCN admission. They are aware that every baby is different clinically and a lot of the problems can vary on degree of severity and duration of stay in SCN. Parents understood what was discussed and have no other questions at this time. I mentioned they can write down any questions and let the nursing staff know and we can come back at anytime. They were made aware that Onsite Neonatology is available 24 hours in house. I mentioned to parents that if baby is born at > 35 weeks GA with no respiratory distress, then he could stay in room with mother and be monitored clinically on MIU. [x]    Explained NICU coverage and team approach  [x]    Answered all questions     Total consultation time ~ 40 minutes including chart review, speaking to family and speaking to staff/provider.

## 2022-11-24 ENCOUNTER — ANESTHESIA (OUTPATIENT)
Dept: LABOR AND DELIVERY | Age: 22
End: 2022-11-24
Payer: COMMERCIAL

## 2022-11-24 ENCOUNTER — ANESTHESIA EVENT (OUTPATIENT)
Dept: LABOR AND DELIVERY | Age: 22
End: 2022-11-24
Payer: COMMERCIAL

## 2022-11-24 PROBLEM — Z3A.35 35 WEEKS GESTATION OF PREGNANCY: Status: ACTIVE | Noted: 2022-11-24

## 2022-11-24 LAB
BASE DEFICIT BLD-SCNC: 4.1 MMOL/L
HCO3 BLDV-SCNC: 21.9 MMOL/L (ref 23–28)
PCO2 BLDCO: 42 MMHG (ref 32–68)
PH BLDCO: 7.32 [PH] (ref 7.15–7.38)
PO2 BLDCO: 27 MMHG
SAO2 % BLDV: 46.2 % (ref 65–88)
SERVICE CMNT-IMP: ABNORMAL
SPECIMEN TYPE: ABNORMAL

## 2022-11-24 PROCEDURE — 7100000011 HC PHASE II RECOVERY - ADDTL 15 MIN

## 2022-11-24 PROCEDURE — 62325 NJX INTERLAMINAR CRV/THRC: CPT | Performed by: ANESTHESIOLOGY

## 2022-11-24 PROCEDURE — 59400 OBSTETRICAL CARE: CPT | Performed by: OBSTETRICS & GYNECOLOGY

## 2022-11-24 PROCEDURE — 6360000002 HC RX W HCPCS: Performed by: STUDENT IN AN ORGANIZED HEALTH CARE EDUCATION/TRAINING PROGRAM

## 2022-11-24 PROCEDURE — 6360000002 HC RX W HCPCS: Performed by: OBSTETRICS & GYNECOLOGY

## 2022-11-24 PROCEDURE — 36600 WITHDRAWAL OF ARTERIAL BLOOD: CPT

## 2022-11-24 PROCEDURE — 7100000010 HC PHASE II RECOVERY - FIRST 15 MIN

## 2022-11-24 PROCEDURE — 7210000100 HC LABOR FEE PER 1 HR

## 2022-11-24 PROCEDURE — 6370000000 HC RX 637 (ALT 250 FOR IP): Performed by: OBSTETRICS & GYNECOLOGY

## 2022-11-24 PROCEDURE — 7220000101 HC DELIVERY VAGINAL/SINGLE

## 2022-11-24 PROCEDURE — 82803 BLOOD GASES ANY COMBINATION: CPT

## 2022-11-24 PROCEDURE — 1100000000 HC RM PRIVATE

## 2022-11-24 PROCEDURE — 99465 NB RESUSCITATION: CPT

## 2022-11-24 RX ORDER — ROPIVACAINE HYDROCHLORIDE 2 MG/ML
INJECTION, SOLUTION EPIDURAL; INFILTRATION; PERINEURAL PRN
Status: DISCONTINUED | OUTPATIENT
Start: 2022-11-24 | End: 2022-11-24 | Stop reason: SDUPTHER

## 2022-11-24 RX ORDER — OXYCODONE HYDROCHLORIDE 5 MG/1
5 TABLET ORAL EVERY 4 HOURS PRN
Status: DISCONTINUED | OUTPATIENT
Start: 2022-11-24 | End: 2022-11-25 | Stop reason: HOSPADM

## 2022-11-24 RX ORDER — MISOPROSTOL 200 UG/1
200 TABLET ORAL PRN
Status: DISCONTINUED | OUTPATIENT
Start: 2022-11-24 | End: 2022-11-25 | Stop reason: HOSPADM

## 2022-11-24 RX ORDER — FENTANYL CITRATE 50 UG/ML
INJECTION, SOLUTION INTRAMUSCULAR; INTRAVENOUS
Status: COMPLETED
Start: 2022-11-24 | End: 2022-11-24

## 2022-11-24 RX ORDER — DOCUSATE SODIUM 100 MG/1
100 CAPSULE, LIQUID FILLED ORAL 2 TIMES DAILY
Status: DISCONTINUED | OUTPATIENT
Start: 2022-11-24 | End: 2022-11-25 | Stop reason: HOSPADM

## 2022-11-24 RX ORDER — SODIUM CHLORIDE, SODIUM LACTATE, POTASSIUM CHLORIDE, CALCIUM CHLORIDE 600; 310; 30; 20 MG/100ML; MG/100ML; MG/100ML; MG/100ML
INJECTION, SOLUTION INTRAVENOUS CONTINUOUS
Status: DISCONTINUED | OUTPATIENT
Start: 2022-11-24 | End: 2022-11-24 | Stop reason: ALTCHOICE

## 2022-11-24 RX ORDER — FAMOTIDINE 20 MG/1
20 TABLET, FILM COATED ORAL 2 TIMES DAILY
Status: DISCONTINUED | OUTPATIENT
Start: 2022-11-24 | End: 2022-11-25 | Stop reason: HOSPADM

## 2022-11-24 RX ORDER — SODIUM CHLORIDE 0.9 % (FLUSH) 0.9 %
5-40 SYRINGE (ML) INJECTION EVERY 12 HOURS SCHEDULED
Status: DISCONTINUED | OUTPATIENT
Start: 2022-11-24 | End: 2022-11-25 | Stop reason: ALTCHOICE

## 2022-11-24 RX ORDER — IBUPROFEN 800 MG/1
800 TABLET ORAL EVERY 8 HOURS
Status: DISCONTINUED | OUTPATIENT
Start: 2022-11-24 | End: 2022-11-25 | Stop reason: HOSPADM

## 2022-11-24 RX ORDER — LANOLIN
CREAM (ML) TOPICAL PRN
Status: DISCONTINUED | OUTPATIENT
Start: 2022-11-24 | End: 2022-11-25 | Stop reason: HOSPADM

## 2022-11-24 RX ORDER — ACETAMINOPHEN 500 MG
1000 TABLET ORAL EVERY 8 HOURS PRN
Status: DISCONTINUED | OUTPATIENT
Start: 2022-11-24 | End: 2022-11-25 | Stop reason: HOSPADM

## 2022-11-24 RX ORDER — SODIUM CHLORIDE 0.9 % (FLUSH) 0.9 %
5-40 SYRINGE (ML) INJECTION PRN
Status: DISCONTINUED | OUTPATIENT
Start: 2022-11-24 | End: 2022-11-25 | Stop reason: ALTCHOICE

## 2022-11-24 RX ORDER — METHYLERGONOVINE MALEATE 0.2 MG/ML
200 INJECTION INTRAVENOUS PRN
Status: DISCONTINUED | OUTPATIENT
Start: 2022-11-24 | End: 2022-11-25 | Stop reason: HOSPADM

## 2022-11-24 RX ORDER — FENTANYL CITRATE 50 UG/ML
INJECTION, SOLUTION INTRAMUSCULAR; INTRAVENOUS PRN
Status: DISCONTINUED | OUTPATIENT
Start: 2022-11-24 | End: 2022-11-24 | Stop reason: SDUPTHER

## 2022-11-24 RX ORDER — SODIUM CHLORIDE 9 MG/ML
INJECTION, SOLUTION INTRAVENOUS PRN
Status: DISCONTINUED | OUTPATIENT
Start: 2022-11-24 | End: 2022-11-24 | Stop reason: ALTCHOICE

## 2022-11-24 RX ADMIN — Medication 166.7 ML: at 15:00

## 2022-11-24 RX ADMIN — FENTANYL CITRATE 100 MCG: 50 INJECTION, SOLUTION INTRAMUSCULAR; INTRAVENOUS at 09:36

## 2022-11-24 RX ADMIN — Medication 1 MILLI-UNITS/MIN: at 10:53

## 2022-11-24 RX ADMIN — IBUPROFEN 800 MG: 800 TABLET, FILM COATED ORAL at 17:42

## 2022-11-24 RX ADMIN — FAMOTIDINE 10 MG: 20 TABLET, FILM COATED ORAL at 13:59

## 2022-11-24 RX ADMIN — ROPIVACAINE HYDROCHLORIDE 6 ML: 2 INJECTION, SOLUTION EPIDURAL; INFILTRATION; PERINEURAL at 09:36

## 2022-11-24 RX ADMIN — FLUOXETINE HYDROCHLORIDE 10 MG: 10 CAPSULE ORAL at 13:59

## 2022-11-24 RX ADMIN — ROPIVACAINE HYDROCHLORIDE 8 ML/HR: 2 INJECTION, SOLUTION EPIDURAL; INFILTRATION; PERINEURAL at 09:37

## 2022-11-24 NOTE — ANESTHESIA PROCEDURE NOTES
Epidural Block    Patient location during procedure: OB  Start time: 11/24/2022 9:23 AM  End time: 11/24/2022 9:29 AM  Reason for block: procedure for pain and labor epidural  Staffing  Anesthesiologist: Ashleigh Jenkins MD  Epidural  Patient position: sitting  Prep: ChloraPrep  Patient monitoring: frequent blood pressure checks and cardiac monitor  Approach: midline  Location: L4-5  Injection technique: GABINO air and GABINO saline  Provider prep: mask and sterile gloves  Needle  Needle type: Tuohy   Needle gauge: 20 G  Needle length: 3.5 in  Needle insertion depth: 5 cm  Catheter type: multi-orifice  Catheter size: 19 G  Catheter at skin depth: 10 cm  Test dose: negativeCatheter Secured: tegaderm and tape  Assessment  Hemodynamics: stable  Outcomes: uncomplicated  Preanesthetic Checklist  Completed: patient identified, IV checked, risks and benefits discussed, surgical/procedural consents, equipment checked, pre-op evaluation, timeout performed, anesthesia consent given, oxygen available and monitors applied/VS acknowledged

## 2022-11-24 NOTE — PROGRESS NOTES
Patient wanted to go to [facility]. Unable to stand and get in wheelchair. Patient assisted back to bed and will call for assistance. Patient stated she was unsure if she wanted to pump.   Dr. Elissa Shipman contacted and he states to keep Flexiril order in [facility] for postpartum as ordered PRN

## 2022-11-24 NOTE — ANESTHESIA PRE PROCEDURE
Department of Anesthesiology  Preprocedure Note       Name:  Oralee Mortimer   Age:  23 y.o.  :  2000                                          MRN:  [id number]         Date:  2022      Surgeon: * No surgeons listed *    Procedure: * No procedures listed *    Medications prior to admission:   Prior to Admission medications    Medication Sig Start Date End Date Taking?  Authorizing Provider   famotidine (PEPCID) 10 MG tablet Take 10 mg by mouth 2 times daily    Historical Provider, MD   FLUoxetine (PROZAC) 10 MG capsule  22   Historical Provider, MD   Prenatal MV & Min w/FA-DHA (PRENATAL Sera Backers) 0.18-25 MG CHEW Take 1 Dose by mouth daily 22   Musa Lopez MD   ondansetron (ZOFRAN) 4 MG tablet Take 4 mg by mouth every 8 hours as needed for Nausea or Vomiting    Historical Provider, MD       Current medications:    Current Facility-Administered Medications   Medication Dose Route Frequency Provider Last Rate Last Admin    fentaNYL (SUBLIMAZE) 100 MCG/2ML injection             cyclobenzaprine (FLEXERIL) tablet 10 mg  10 mg Oral TID PRN Kim Urias MD   10 mg at 22 2208    lactated ringers infusion   IntraVENous Continuous John Tolliver  mL/hr at 22 2242 New Bag at 22 2242    sodium chloride flush 0.9 % injection 5-40 mL  5-40 mL IntraVENous 2 times per day John Tolliver MD        sodium chloride flush 0.9 % injection 5-40 mL  5-40 mL IntraVENous PRN John Tolliver MD        0.9 % sodium chloride infusion   IntraVENous PRN John Tolliver MD        ondansetron (ZOFRAN-ODT) disintegrating tablet 4 mg  4 mg Oral Q8H PRN John Tolliver MD   4 mg at 22 2211    Or    ondansetron (ZOFRAN) injection 4 mg  4 mg IntraVENous Q6H PRN John Tolliver MD   4 mg at 22 1008    acetaminophen (TYLENOL) tablet 650 mg  650 mg Oral Q4H PRN John Tolliver MD   650 mg at 22 2152    Or    acetaminophen (TYLENOL) suppository 650 mg  650 mg Rectal Q4H PRN John Tolliver MD  famotidine (PEPCID) tablet 10 mg  10 mg Oral BID Mariana Azevedo MD   10 mg at 22    FLUoxetine (PROZAC) capsule 10 mg  10 mg Oral Daily Mariana Azevedo MD   10 mg at 22 0921    NIFEdipine (PROCARDIA) capsule 20 mg  20 mg Oral 4 times per day Ira K Alt, DO   10 mg at 22 1200    butorphanol (STADOL) injection 1 mg  1 mg IntraVENous Q3H PRN Ira K Alt, DO   1 mg at 22    promethazine (PHENERGAN) 12.5 mg in sodium chloride 0.9 % 50 mL IVPB  12.5 mg IntraVENous Q6H PRN Ira K Alt, DO           Allergies: Allergies   Allergen Reactions    Amoxicillin-Pot Clavulanate Hives     Other reaction(s): Hives/Swelling-Allergy       Problem List:    Patient Active Problem List   Diagnosis Code    Supervision of normal first teen pregnancy Z34.00    Anxiety and depression F41.9, F32. A     labor without delivery O60.00       Past Medical History:        Diagnosis Date    Anxiety and depression     Asthma     childhood    Kawasaki disease ([facility] [address]     at age 13       Past Surgical History:        Procedure Laterality Date    FINGER SURGERY      LABIAPLASTY      MYRINGOTOMY      WISDOM TOOTH EXTRACTION         Social History:    Social History     Tobacco Use    Smoking status: Never    Smokeless tobacco: Never   Substance Use Topics    Alcohol use: Not Currently                                Counseling given: Not Answered      Vital Signs (Current):   Vitals:    22 0156 22 0536 22 0930 22 0931   BP:  124/81 132/72 (!) 141/67   Pulse: 87 (!) 104 97 93   Resp:  16     Temp:  97.7 °F (36.5 °C)     TempSrc:  Oral     SpO2: 98%                                                 BP Readings from Last 3 Encounters:   22 (!) 141/67   22 118/72   10/27/22 102/60       NPO Status:                                                                                 BMI:   Wt Readings from Last 3 Encounters:   22 164 lb (74.4 kg)   10/27/22 156 lb 6.4 oz (70.9 kg)   10/06/22 153 lb (69.4 kg)     There is no height or weight on file to calculate BMI.    CBC:   Lab Results   Component Value Date/Time    WBC 16.0 11/22/2022 04:34 PM    RBC 3.87 11/22/2022 04:34 PM    HGB 11.9 11/22/2022 04:34 PM    HCT 36.4 11/22/2022 04:34 PM    MCV 94.1 11/22/2022 04:34 PM    RDW 13.0 11/22/2022 04:34 PM     11/22/2022 04:34 PM       CMP: No results found for: NA, K, CL, CO2, BUN, CREATININE, GFRAA, AGRATIO, LABGLOM, GLUCOSE, GLU, PROT, CALCIUM, BILITOT, ALKPHOS, AST, ALT    POC Tests: No results for input(s): POCGLU, POCNA, POCK, POCCL, POCBUN, POCHEMO, POCHCT in the last 72 hours. Coags: No results found for: PROTIME, INR, APTT    HCG (If Applicable): No results found for: PREGTESTUR, PREGSERUM, HCG, HCGQUANT     ABGs: No results found for: PHART, PO2ART, CDL6AXE, RWA6MYM, BEART, X7YWBHAM     Type & Screen (If Applicable):  No results found for: LABABO, LABRH    Drug/Infectious Status (If Applicable):  No results found for: HIV, HEPCAB    COVID-19 Screening (If Applicable): No results found for: COVID19        Anesthesia Evaluation  Patient summary reviewed and Nursing notes reviewed  Airway: Mallampati: II  TM distance: >3 FB   Neck ROM: full  Mouth opening: > = 3 FB   Dental:          Pulmonary:Negative Pulmonary ROS breath sounds clear to auscultation                             Cardiovascular:  Exercise tolerance: good (>4 METS),           Rhythm: regular  Rate: normal                    Neuro/Psych:   (+) depression/anxiety             GI/Hepatic/Renal: Neg GI/Hepatic/Renal ROS            Endo/Other:                      ROS comment: H/O Kawasaki dz in childhood Abdominal:             Vascular: negative vascular ROS. Other Findings:           Anesthesia Plan      epidural     ASA 2       Induction: intravenous. Anesthetic plan and risks discussed with patient and spouse.                         Vaibhav Hernandez MD   11/24/2022

## 2022-11-24 NOTE — PROGRESS NOTES
[id number] Call for Epidural. Spoke with Dr. Rose Members r/t Epidural placement for pain management. Per Dr. Kelsea Rinaldi criteria for labor Epidural not met at this time and for patient/infant safety r/t pt. Is not currently in active labor, no cervical change, intact membranes  and length of time until possible planned induction after steroid maturity at 1630. Patient and family notified of conversation with Dr. Kelsea Rinaldi as stated above. Agree with plan of care. Declines natural/therapeutic labor pain relief methods. Pt. Denies any needs at present and states that Stadol has helped with pain and that she is just going to try to rest at this time.

## 2022-11-24 NOTE — PROCEDURES
Delivery Note    Patient Name: Ellyn Gregorio  MRN: [id number]    Obstetrician:  Ezra Yee MD    Assistant: none    Pre-Delivery Diagnosis:  pregnancy <37 weeks, Single fetus, and Pregnancy complicated by: unbearable pain, requiring delivery of  baby    Post-Delivery Diagnosis: Male    Intrapartum Event: None    Procedure: Spontaneous vaginal delivery    Epidural:  yes    Monitor:  Fetal Heart Tones - External and Uterine Contractions - External    Indications for instrumental delivery: none    Estimated Blood Loss: 200    Episiotomy: none    Laceration(s):  none    Presentation: Cephalic    Fetal Description: arrington    Fetal Position: Left Occiput Anterior    Birth Weight: 6-0    Birth Length: 49    Apgar - One Minute: 8    Apgar - Five Minutes: 8    Umbilical Cord: Nuchal Cord x  1, 3 vessels present, and Cord blood sent to lab for type, Rh, and Leif' test    Specimens: no           Complications:  none             Attending Attestation: I was present and scrubbed for the entire procedure.

## 2022-11-25 VITALS
HEART RATE: 82 BPM | OXYGEN SATURATION: 98 % | RESPIRATION RATE: 18 BRPM | SYSTOLIC BLOOD PRESSURE: 118 MMHG | TEMPERATURE: 97.5 F | DIASTOLIC BLOOD PRESSURE: 90 MMHG

## 2022-11-25 PROBLEM — Z3A.35 35 WEEKS GESTATION OF PREGNANCY: Status: RESOLVED | Noted: 2022-11-24 | Resolved: 2022-11-25

## 2022-11-25 PROBLEM — F41.9 ANXIETY AND DEPRESSION: Status: RESOLVED | Noted: 2022-06-14 | Resolved: 2022-11-25

## 2022-11-25 PROBLEM — O60.00 PRETERM LABOR WITHOUT DELIVERY: Status: RESOLVED | Noted: 2022-11-22 | Resolved: 2022-11-25

## 2022-11-25 PROBLEM — F32.A ANXIETY AND DEPRESSION: Status: RESOLVED | Noted: 2022-06-14 | Resolved: 2022-11-25

## 2022-11-25 PROBLEM — Z34.00 SUPERVISION OF NORMAL FIRST TEEN PREGNANCY: Status: RESOLVED | Noted: 2022-06-14 | Resolved: 2022-11-25

## 2022-11-25 LAB
BASE DEFICIT BLD-SCNC: 5.7 MMOL/L
HCO3 BLD-SCNC: 23.7 MMOL/L (ref 22–26)
PCO2 BLDCO: 59 MMHG (ref 32–68)
PH BLDCO: 7.21 [PH] (ref 7.15–7.38)
PO2 BLDCO: 18 MMHG
SAO2 % BLD: 18.1 % (ref 95–98)
SERVICE CMNT-IMP: ABNORMAL
SPECIMEN TYPE: ABNORMAL

## 2022-11-25 PROCEDURE — 6370000000 HC RX 637 (ALT 250 FOR IP): Performed by: OBSTETRICS & GYNECOLOGY

## 2022-11-25 RX ORDER — IBUPROFEN 800 MG/1
800 TABLET ORAL EVERY 8 HOURS PRN
Qty: 40 TABLET | Refills: 1 | Status: SHIPPED | OUTPATIENT
Start: 2022-11-25

## 2022-11-25 RX ADMIN — FLUOXETINE HYDROCHLORIDE 10 MG: 10 CAPSULE ORAL at 08:11

## 2022-11-25 RX ADMIN — IBUPROFEN 800 MG: 800 TABLET, FILM COATED ORAL at 08:12

## 2022-11-25 RX ADMIN — DOCUSATE SODIUM 100 MG: 100 CAPSULE ORAL at 08:11

## 2022-11-25 RX ADMIN — FAMOTIDINE 20 MG: 20 TABLET, FILM COATED ORAL at 08:11

## 2022-11-25 NOTE — PROGRESS NOTES
Scheduled meds. Patient wants early discharge.   Patient states she does not want to pump and only formula feed

## 2022-11-25 NOTE — CARE COORDINATION
Chart reviewed - first time parent; baby in NICU (35w1d, respiratory distress). SW met with patient and FOB to complete initial assessment. Family feels that they are coping well with mahendra Willis's need to be in the NICU. Family lives in [place] and is around 40 minutes from the hospital.  Family reports that they have reliable transportation to/from hospital.      Patient confirms a history of depression and anxiety. She is currently on Prozac, and she's been on this medication since she was 13 y/o. Per patient, this medication \"works great,\" and she plans to remain on it postpartum. Patient given informational packet on  mood & anxiety disorders (resources/education). Family denies any additional needs from  at this time. Family has 's contact information should any needs/questions arise.     TIA Coreas, [address] [facility]   [phone]

## 2022-11-25 NOTE — ANESTHESIA POSTPROCEDURE EVALUATION
Department of Anesthesiology  Postprocedure Note    Patient: Esther Weller  MRN: [id number]  YOB: 2000  Date of evaluation: 11/24/2022      Procedure Summary     Date: 11/24/22 Room / Location:     Anesthesia Start: 0916 Anesthesia Stop: 5661    Procedure: Labor Analgesia Diagnosis:     Scheduled Providers:  Responsible Provider: Aly Ferrell MD    Anesthesia Type: epidural ASA Status: 2          Anesthesia Type: No value filed.     Lucia Phase I:      Lucia Phase II:        Anesthesia Post Evaluation    Patient location during evaluation: PACU  Patient participation: complete - patient participated  Level of consciousness: awake and alert  Airway patency: patent  Nausea & Vomiting: no nausea and no vomiting  Complications: no  Cardiovascular status: hemodynamically stable  Respiratory status: acceptable  Hydration status: euvolemic  Multimodal analgesia pain management approach

## 2022-11-25 NOTE — DISCHARGE INSTRUCTIONS
After Your Delivery (the Postpartum Period): Care Instructions  Overview     Congratulations on the birth of your baby. Like pregnancy, the  period can be a time of excitement, jsoe, and exhaustion. You may look at your wondrous little baby and feel happy. You may also be overwhelmed by your new sleep hours and new responsibilities. At first, babies often sleep during the days and are awake at night. They do not have a pattern or routine. They may make sudden gasps, jerk themselves awake, or look like they have crossed eyes. These are all normal, and they may even make you smile. In these first weeks after delivery, try to take good care of yourself. It may take 4 to 6 weeks to feel like yourself again, and possibly longer if you had a  birth. You will likely feel very tired for several weeks. Your days will be full of ups and downs, but lots of jose as well. Follow-up care is a key part of your treatment and safety. Be sure to make and go to all appointments, and call your doctor if you are having problems. It's also a good idea to know your test results and keep a list of the medicines you take. How can you care for yourself at home? Take care of your body after delivery  Use pads instead of tampons for the bloody flow that may last as long as 2 weeks. Ease cramps with ibuprofen (Advil, Motrin). Ease soreness of hemorrhoids and the area between your vagina and rectum with ice compresses or witch hazel pads. Ease constipation by drinking lots of fluid and eating high-fiber foods. Ask your doctor about over-the-counter stool softeners. Cleanse yourself with a gentle squeeze of warm water from a bottle instead of wiping with toilet paper. Take a sitz bath in warm water several times a day. Wear a good nursing bra. Ease sore and swollen breasts with warm, wet washcloths. If you aren't breastfeeding, use ice rather than heat for breast soreness.   Your period may not start for several months if you are breastfeeding. You may bleed more, and longer at first, than you did before you got pregnant. Wait until you are healed (about 4 to 6 weeks) before you have sex. Ask your doctor when it is okay for you to have sex. Try not to travel with your baby for 5 or 6 weeks. If you take a long car trip, make frequent stops to walk around and stretch. Avoid exhaustion  Rest every day. Try to nap when your baby naps. Ask another adult to be with you for a few days after delivery. Plan for  if you have other children. Stay flexible so you can eat at odd hours and sleep when you need to. Both you and your baby are making new schedules. Plan small trips to get out of the house. Change can make you feel less tired. Ask for help with housework, cooking, and shopping. Remind yourself that your job is to care for your baby. Know about help for postpartum depression  \"Baby blues\" are common for the first 1 to 2 weeks after birth. You may cry or feel sad or irritable for no reason. Rest whenever you can. Being tired makes it harder to handle your emotions. Go for walks with your baby. Talk to your partner, friends, and family about your feelings. If your symptoms last for more than a few weeks, or if you feel very depressed, ask your doctor for help. Postpartum depression can be treated. Support groups and counseling can help. Sometimes medicine can also help. Stay healthy  Eat healthy foods so you have more energy. If you breastfeed, avoid drugs. If you quit smoking during pregnancy, try to stay smoke-free. If you choose to have a drink now and then, have only one drink, and limit the number of occasions that you have a drink. Wait to breastfeed at least 2 hours after you have a drink to reduce the amount of alcohol the baby may get in the milk. Start daily exercise after 4 to 6 weeks, but rest when you feel tired. Learn exercises to tone your belly.  Try Kegel exercises to regain strength in your pelvic muscles. You can do these exercises while you stand or sit. (If doing these exercises causes pain, stop doing them and talk with your doctor.)  Squeeze your muscles as if you were trying not to pass gas. Or squeeze your muscles as if you were stopping the flow of urine. Your belly, legs, and buttocks shouldn't move. Hold the squeeze for 3 seconds, then relax for 5 to 10 seconds. Start with 3 seconds, then add 1 second each week until you are able to squeeze for 10 seconds. Repeat the exercise 10 times a session. Do 3 to 8 sessions a day. Find a class for you and your baby that has an exercise time. If you had a  birth, give yourself a bit more time before you exercise, and be careful. When should you call for help? Share this information with your partner, family, or a friend. They can help you watch for warning signs. Call 911  anytime you think you may need emergency care. For example, call if:    You have thoughts of harming yourself, your baby, or another person. You passed out (lost consciousness). You have chest pain, are short of breath, or cough up blood. You have a seizure. Call your doctor now or seek immediate medical care if:    You have signs of hemorrhage (too much bleeding), such as:  Heavy vaginal bleeding. This means that you are soaking through one or more pads in an hour. Or you pass blood clots bigger than an egg. Feeling dizzy or lightheaded, or you feel like you may faint. Feeling so tired or weak that you cannot do your usual activities. A fast or irregular heartbeat. New or worse belly pain. You have signs of infection, such as:  A fever. Vaginal discharge that smells bad. New or worse belly pain. You have symptoms of a blood clot in your leg (called a deep vein thrombosis), such as:  Pain in the calf, back of the knee, thigh, or groin. Redness and swelling in your leg or groin.      You have signs of preeclampsia, such as:  Sudden swelling of your face, hands, or feet. New vision problems (such as dimness, blurring, or seeing spots). A severe headache. Watch closely for changes in your health, and be sure to contact your doctor if:    Your vaginal bleeding isn't decreasing. You feel sad, anxious, or hopeless for more than a few days. You are having problems with your breasts or breastfeeding. Where can you learn more? Go to https://DocsInkpeidaeweb.healthAvimoto. org and sign in to your [employer] account. Enter N056 in the [employer] box to learn more about \"After Your Delivery (the Postpartum Period): Care Instructions. \"     If you do not have an account, please click on the \"Sign Up Now\" link. Current as of: February 23, 2022               Content Version: 13.4  © 0384-8558 Healthwise, Incorporated. Care instructions adapted under license by [facility] ([facility]). If you have questions about a medical condition or this instruction, always ask your healthcare professional. [place] [address] any warranty or liability for your use of this information.

## 2022-11-25 NOTE — DISCHARGE SUMMARY
[facility] [address] [place] [address] Discharge Summary     Patient ID:  Isabel Head  [id number]  23 y.o.  2000    Admit date: 2022    Discharge date and time: No discharge date for patient encounter. Admitting Physician: Ja Robbins MD     Discharge Physician: Kim Cuello M.D. Admission Diagnoses:  labor without delivery [O60.00]    Problem List: Hospital - Principal Problem (Resolved):     labor without delivery  Active Problems:    * No active hospital problems. *  Resolved Problems:    35 weeks gestation of pregnancy   ; Other -   Patient Active Problem List   Diagnosis   (none) - all problems resolved or deleted        Discharge Diagnoses:  labor without delivery [O60.00]    Hospital Course: Isabel Head had unremarkeable progressive recovery. , Eating, ambulating, and voiding in a routine manner. , and Hospital course complicated by  delivery    Significant Diagnostic Studies:   Recent Results (from the past 24 hour(s))   POCT Blood Gas, Cord Blood    Collection Time: 22  4:37 PM   Result Value Ref Range    ph, Cord Blood, POC 7.32 7.15 - 7.38      PCO2, Cord Blood, POC 42 32 - 68 mmHg    PO2, Cord Blood, POC 27 mmHg    HCO3, Venous 21.9 (L) 23 - 28 MMOL/L    SO2, VENOUS (POC) 46.2 (L) 65 - 88 %    BASE DEFICIT (POC) 4.1 mmol/L    Specimen type: VENOUS CORD      Performed by: Johnathan Varghese    POCT Blood Gas, Cord Blood    Collection Time: 22  4:37 PM   Result Value Ref Range    ph, Cord Blood, POC 7.21 7.15 - 7.38      PCO2, Cord Blood, POC 59 32 - 68 mmHg    PO2, Cord Blood, POC 18 mmHg    HCO3, Mixed 23.7 22 - 26 MMOL/L    SO2c, Arterial, POC 18.1 (L) 95 - 98 %    BASE DEFICIT (POC) 5.7 mmol/L    Specimen type: ARTERIAL CORD      Performed by: Johnathan Varghese        Procedures: spontaneous vaginal delivery    Discharge Exam:  BP (!) 118/90   Pulse 82   Temp 97.5 °F (36.4 °C) (Oral)   Resp 18   LMP 2022   SpO2 98%   Breastfeeding Unknown      Heart: regular rate and rhythm, S1, S2 normal, no murmur, click, rub or gallop  Lungs:clear to auscultation bilaterally  Extremities: normal, atraumatic, no cyanosis or edema. No DVT  Incision/episiotomy: no significant drainage, no dehiscence, no significant erythema    Patient Instructions:   Current Discharge Medication List        START taking these medications    Details   ibuprofen (ADVIL;MOTRIN) 800 MG tablet Take 1 tablet by mouth every 8 hours as needed for Pain  Qty: 40 tablet, Refills: 1           CONTINUE these medications which have NOT CHANGED    Details   famotidine (PEPCID) 10 MG tablet Take 10 mg by mouth 2 times daily      FLUoxetine (PROZAC) 10 MG capsule       Prenatal MV & Min w/FA-DHA (PRENATAL GUMMIES) 0.18-25 MG CHEW Take 1 Dose by mouth daily  Qty: 90 tablet, Refills: 5           STOP taking these medications       ondansetron (ZOFRAN) 4 MG tablet Comments:   Reason for Stopping:              Activity: physical activity is restricted per discharge instructions  Diet: resume normal diet      Follow-up with Ibrahima/Harlan in 2 weeks.     Signed:  Itzel Sierra MD  11/25/2022  10:34 AM

## 2022-11-26 LAB
BACTERIA SPEC CULT: NORMAL
SERVICE CMNT-IMP: NORMAL

## 2022-11-30 NOTE — CARE COORDINATION
Referral made to [facility]  home visit program.    DEEPTHI Sanabria-MARLENY, [address] [facility]   [phone]

## 2024-11-06 ENCOUNTER — TELEPHONE (OUTPATIENT)
Dept: OBGYN CLINIC | Age: 24
End: 2024-11-06

## 2024-11-06 NOTE — TELEPHONE ENCOUNTER
Patient called OB line stating that she had a private ultrasound done and it revealed an ovarian cyst. Patient wanted medical advice regarding the cyst. Informed the patient that since this was a private ultrasound and we do not have the results we cannot advise her medically.

## 2024-11-11 ENCOUNTER — PATIENT MESSAGE (OUTPATIENT)
Dept: OBGYN CLINIC | Age: 24
End: 2024-11-11

## 2024-11-11 RX ORDER — ONDANSETRON 8 MG/1
8 TABLET, FILM COATED ORAL EVERY 8 HOURS PRN
Qty: 30 TABLET | Refills: 2 | Status: SHIPPED | OUTPATIENT
Start: 2024-11-11